# Patient Record
Sex: FEMALE | Race: WHITE | NOT HISPANIC OR LATINO | Employment: FULL TIME | URBAN - METROPOLITAN AREA
[De-identification: names, ages, dates, MRNs, and addresses within clinical notes are randomized per-mention and may not be internally consistent; named-entity substitution may affect disease eponyms.]

---

## 2017-01-31 ENCOUNTER — HOSPITAL ENCOUNTER (OUTPATIENT)
Dept: MAMMOGRAPHY | Facility: CLINIC | Age: 52
Discharge: HOME | End: 2017-01-31
Attending: INTERNAL MEDICINE | Admitting: INTERNAL MEDICINE
Payer: COMMERCIAL

## 2017-01-31 DIAGNOSIS — Z12.31 VISIT FOR SCREENING MAMMOGRAM: ICD-10-CM

## 2017-01-31 PROCEDURE — 77063 BREAST TOMOSYNTHESIS BI: CPT

## 2017-01-31 PROCEDURE — G0202 SCR MAMMO BI INCL CAD: HCPCS

## 2017-08-26 ENCOUNTER — HOSPITAL ENCOUNTER (EMERGENCY)
Facility: CLINIC | Age: 52
Discharge: HOME | End: 2017-08-26
Attending: EMERGENCY MEDICINE | Admitting: EMERGENCY MEDICINE
Payer: COMMERCIAL

## 2017-08-26 VITALS
OXYGEN SATURATION: 99 % | DIASTOLIC BLOOD PRESSURE: 81 MMHG | SYSTOLIC BLOOD PRESSURE: 120 MMHG | HEART RATE: 75 BPM | RESPIRATION RATE: 16 BRPM | TEMPERATURE: 97.3 F

## 2017-08-26 DIAGNOSIS — M54.5 ACUTE LOW BACK PAIN, UNSPECIFIED BACK PAIN LATERALITY, WITH SCIATICA PRESENCE UNSPECIFIED: ICD-10-CM

## 2017-08-26 PROCEDURE — 99282 EMERGENCY DEPT VISIT SF MDM: CPT

## 2017-08-26 RX ORDER — LIDOCAINE 50 MG/G
PATCH TOPICAL
Qty: 20 PATCH | Refills: 0 | Status: SHIPPED | OUTPATIENT
Start: 2017-08-26 | End: 2019-12-23

## 2017-08-26 RX ORDER — METHYLPREDNISOLONE 4 MG
TABLET, DOSE PACK ORAL
Qty: 21 TABLET | Refills: 0 | Status: SHIPPED | OUTPATIENT
Start: 2017-08-26 | End: 2019-12-23

## 2017-08-26 RX ORDER — CYCLOBENZAPRINE HCL 10 MG
5-10 TABLET ORAL 3 TIMES DAILY PRN
Qty: 20 TABLET | Refills: 0 | Status: SHIPPED | OUTPATIENT
Start: 2017-08-26 | End: 2019-12-23

## 2017-08-26 ASSESSMENT — ENCOUNTER SYMPTOMS
DIARRHEA: 0
DYSURIA: 0
DIFFICULTY URINATING: 0
BACK PAIN: 1
CONSTIPATION: 0

## 2017-08-26 NOTE — ED AVS SNAPSHOT
Emergency Department    [address] [facility] [address]    Phone:  [phone]    Fax:  [phone]                                       Rae Pearson   MRN: [id number]    Department:   Emergency Department   Date of Visit:  8/26/2017           After Visit Summary Signature Page     I have received my discharge instructions, and my questions have been answered. I have discussed any challenges I see with this plan with the nurse or doctor.    ..........................................................................................................................................  Patient/Patient Representative Signature      ..........................................................................................................................................  Patient Representative Print Name and Relationship to Patient    ..................................................               ................................................  Date                                            Time    ..........................................................................................................................................  Reviewed by Signature/Title    ...................................................              ..............................................  Date                                                            Time

## 2017-08-26 NOTE — DISCHARGE INSTRUCTIONS
Start the steroids today  Use flexeril for muscle spasms  Can also try the lidocaine patches  Continue to see physical therapy  Use the tylenol and ibuprofen for pain but be cautious on how much of the ibuprofen/sulindac you are using with the steroids  FOllow up with your primary care provider later this week  Discharge Instructions  Back Pain  You were seen today for back pain. Back pain can have many causes, but most will get better without surgery or other specific treatment. Sometimes there is a herniated ( slipped ) disc. We don t usually do MRI scans to look for these right away, since most herniated discs will get better on their own with time.  Today, we did not find any evidence that your back pain was caused by a serious condition, such as an infection, fracture, or tumor. However, sometimes symptoms develop over time and cannot be found during an emergency visit, so it is very important that you follow up with your primary doctor.  Return to the Emergency Department if:    You develop a fever with your back pain.     You have weakness or change in sensation in one or both legs.    You lose control of your bowels or bladder, or can t empty your bladder.    Your pain gets much worse.     Follow-up with your doctor:    Unless your pain has completely gone away, please make an appointment with your doctor within one week.  You may need further management of your back pain, such as more pain medication, imaging such as an X-ray or MRI, or physical therapy.    What can I do to help myself?    Remain Active -- People are often afraid that they will hurt their back further or delay recovery by remaining active, but this is one of the best things you can do for your back. In fact, prolonged bed rest is not recommended. Studies have shown that people with low back pain recover faster when they remain active. Movement helps to bring blood flow to the muscles and relieve muscle spasms as well as preventing loss of  muscle strength.    Heat -- Using a heating pad can help with low back pain during the first few weeks. Do not sleep with a heating pad, as you can be burned.     Pain medications - You may take a pain medication such as Tylenol  (acetaminophen), Advil , Nuprin  (ibuprofen) or Aleve  (naproxen).  If you have been given a narcotic such as Vicodin  (hydrocodone with acetaminophen), Percocet  (oxycodone with acetaminophen), codeine, or a muscle relaxant such as Flexeril  (cyclobenzaprine) or Soma  (carisoprodol), do not drive for four hours after you have taken it. If the narcotic contains Tylenol  (acetaminophen), do not take Tylenol  with it. All narcotics will cause constipation, so eat a high fiber diet.   If you were given a prescription for medicine here today, be sure to read all of the information (including the package insert) that comes with your prescription.  This will include important information about the medicine, its side effects, and any warnings that you need to know about.  The pharmacist who fills the prescription can provide more information and answer questions you may have about the medicine.  If you have questions or concerns that the pharmacist cannot address, please call or return to the Emergency Department.   Remember that you can always come back to the Emergency Department if you are not able to see your regular doctor in the amount of time listed above, if you get any new symptoms, or if there is anything that worries you.

## 2017-08-26 NOTE — ED PROVIDER NOTES
History     Chief Complaint:  Back Pain     HPI   Rae Pearson is a 51 year old female who presents with back pain. In October the patient started going to see a  where she began performing a number of non-weight bearing exercises to help lose weight and help her back as she was having back problems a year ago. For the last ten days the patient has had back pain which she states may have been caused by going to her  twice during that week. Since that time the pain has been getting better. However today the patient was reaching to  jozef litter, which caused her back pain to flair up. The increase of pain today is the reason that she presents to the emergency department today. Currently the patient rates her central lower back pain at a 5/10 severity, and states it is a shooting pain that radiates down into her legs. Similar pain one year ago that improved with steroids. Sitting makes the pain worse, while walking around seems to make it a bit better. To combat this pain the patient has been alternating with three Tylenol in the morning and three ibuprofen with dinner. She has also has been stretching and icing her back to help the pain. The patient denies any bowel or bladder issues or a history of diabetes. No numbness/tingling or weakness. There are no other complaints at this time.  No falls.    Allergies:  Amitriptyline Hcl  Contrast Dye  Tetracycline        Medications:    Atenolol  Hydrochlorothiazide  Omeprazole  Sulindac  Ranitidine HCL  Cipro    Past Medical History:    Diverticulosis  Gastro-esophageal reflux disease  Hypertension  Neck pain    Past Surgical History:    Eye surgery  GYN surgery    Family History:    History reviewed. No pertinent family history.      Social History:  Presents with a family member   Tobacco use: Current everyday smoker   Alcohol use: N/A  PCP: NEEMA GUTIERREZ    Marital Status:  Single     Review of Systems   Gastrointestinal: Negative  for constipation and diarrhea.   Genitourinary: Negative for difficulty urinating and dysuria.   Musculoskeletal: Positive for back pain.   All other systems reviewed and are negative.    Physical Exam     Patient Vitals for the past 24 hrs:   BP Temp Temp src Pulse Resp SpO2   08/26/17 1321 - - - 75 16 99 %   08/26/17 1310 120/81 97.3  F (36.3  C) Temporal 74 16 97 %           Physical Exam    General: The patient is walking around in her room  Head:  The scalp, face, and head appear normal  Eyes:  The pupils are equal and round  ENT:    Moist mucous membranes  Neck:  Normal range of motion  CV:  Regular rate and underlying rhythm     DP/PT pulses 2+ bilaterally    Normal S1 and S2  Resp:  Lungs are clear    There is no tachypnea    Non-labored breathing    No rales    No wheezing   GI:  Abdomen is soft, there is no rigidity    No distension    No guarding    No abdominal tenderness    Non-surgical without peritoneal features  MS:  Back:    The cervical and thoracic spine is non-tender in the midline    The lumbar spine is non-tender in the midline    There is lumbar paraspinous muscular pain to palpation bilaterally    Legs:    There is normal motor strength:     Iliopsoas, quadriceps, hamstrings, tibialis anterior, gastrocnemius, EHL    There is no sensory abnormality/paresthesia    There is no numbness on lower extremities    Patellar reflexes are normal    There is normal capillary refill to the toes  Skin:  No rash or acute skin lesions noted.  No shingles noted.  Neuro: Speech is normal and fluent    Face symmetric    Gait stable    Emergency Department Course     Emergency Department Course:    Past medical records, nursing notes, and vitals reviewed.  1317: I performed an exam of the patient as documented above. Clinical findings and plan explained to the Patient. Patient discharged home with instructions regarding supportive care, medications, and reasons to return as well as the importance of close  follow-up were reviewed.        Impression & Plan      Medical Decision Making:    The patients presents for evaluation of low back pain. There is no history of recent trauma, fever, new neurologic symptoms or other high risk features. With no midline tenderness and no history of recent trauma, I do not feel x-rays are indicated and no  evidence to support the need for emergent MRI. Findings at this time are most consistent with mechanical low back pain without evidence of acute cord compromise or cauda equina syndrome. The patient has a normal neurologic exam of the lower extremities. History and exam at this time do no support alternate cause of low back pain including but not limited to spinal epidural abscess or hematoma, discitis, spine fracture (no direct trauma to the back), or any serious acute intraabdominal process (including but not limited to renal lithiasis, pyelonephritis, AAA, or aortic dissection). The patient does not have a history of malignancy. The patient has no history of IV drug use. The patient was counseled regarding the need for supportive care and close follow up with primary care. Indications for return to seek urgent re-evaluation were reviewed including but not  limited to change to urinary or bowel patterns, saddle numbness, leg weakness, worsening or no improvement in pain. A prescription for Flexeril, Medrol Dosepak, and Lidoderm were provided. Early mobilization, continued follow up with  and PCP recommended. Reports improvement in past with steroids so will start these again and cautioned about side effects of this medication.    Diagnosis:    ICD-10-CM   1. Acute low back pain, unspecified back pain laterality, with sciatica presence unspecified M54.5       Disposition:  The patient was discharged to home with a plan as discussed.     Discharge Medications:  Discharge Medication List as of 8/26/2017  1:37 PM      START taking these medications    Details   cyclobenzaprine  (FLEXERIL) 10 MG tablet Take 0.5-1 tablets (5-10 mg) by mouth 3 times daily as needed for muscle spasms, Disp-20 tablet, R-0, Local Print      methylPREDNISolone (MEDROL DOSEPAK) 4 MG tablet Follow package instructions, Disp-21 tablet, R-0, Local Print      lidocaine (LIDODERM) 5 % Patch Apply up to 3 patches to painful area at once for up to 12 h within a 24 h period.  Remove after 12 hours.Disp-20 patch, R-0Local Print           Gume Leon  8/26/2017    EMERGENCY DEPARTMENT    I, Gume Leon, am serving as a scribe at 1:17 PM on 8/26/2017 to document services personally performed by Carmela Carty MD based on my observations and the provider's statements to me.       Carmela Carty MD  08/26/17 1937

## 2017-08-26 NOTE — ED AVS SNAPSHOT
Emergency Department    [address] [facility] [address]    Phone:  [phone]    Fax:  [phone]                                       Rae Pearson   MRN: [id number]    Department:   Emergency Department   Date of Visit:  8/26/2017           Patient Information     Date Of Birth          1965        Your diagnoses for this visit were:     Acute low back pain, unspecified back pain laterality, with sciatica presence unspecified        You were seen by Carmela Carty MD.      Follow-up Information     Schedule an appointment as soon as possible for a visit with Jill Magana.    Specialty:  Internal Medicine    Contact information:    [address] [address] [phone]          Follow up with  Emergency Department.    Specialty:  EMERGENCY MEDICINE    Why:  If symptoms worsen    Contact information:    [address] [facility] [id number] [phone]        Discharge Instructions       Start the steroids today  Use flexeril for muscle spasms  Can also try the lidocaine patches  Continue to see physical therapy  Use the tylenol and ibuprofen for pain but be cautious on how much of the ibuprofen/sulindac you are using with the steroids  FOllow up with your primary care provider later this week  Discharge Instructions  Back Pain  You were seen today for back pain. Back pain can have many causes, but most will get better without surgery or other specific treatment. Sometimes there is a herniated ( slipped ) disc. We don t usually do MRI scans to look for these right away, since most herniated discs will get better on their own with time.  Today, we did not find any evidence that your back pain was caused by a serious condition, such as an infection, fracture, or tumor. However, sometimes symptoms develop over time and cannot be found during an emergency visit, so it is very important that you follow up  with your primary doctor.  Return to the Emergency Department if:    You develop a fever with your back pain.     You have weakness or change in sensation in one or both legs.    You lose control of your bowels or bladder, or can t empty your bladder.    Your pain gets much worse.     Follow-up with your doctor:    Unless your pain has completely gone away, please make an appointment with your doctor within one week.  You may need further management of your back pain, such as more pain medication, imaging such as an X-ray or MRI, or physical therapy.    What can I do to help myself?    Remain Active -- People are often afraid that they will hurt their back further or delay recovery by remaining active, but this is one of the best things you can do for your back. In fact, prolonged bed rest is not recommended. Studies have shown that people with low back pain recover faster when they remain active. Movement helps to bring blood flow to the muscles and relieve muscle spasms as well as preventing loss of muscle strength.    Heat -- Using a heating pad can help with low back pain during the first few weeks. Do not sleep with a heating pad, as you can be burned.     Pain medications - You may take a pain medication such as Tylenol  (acetaminophen), Advil , Nuprin  (ibuprofen) or Aleve  (naproxen).  If you have been given a narcotic such as Vicodin  (hydrocodone with acetaminophen), Percocet  (oxycodone with acetaminophen), codeine, or a muscle relaxant such as Flexeril  (cyclobenzaprine) or Soma  (carisoprodol), do not drive for four hours after you have taken it. If the narcotic contains Tylenol  (acetaminophen), do not take Tylenol  with it. All narcotics will cause constipation, so eat a high fiber diet.   If you were given a prescription for medicine here today, be sure to read all of the information (including the package insert) that comes with your prescription.  This will include important information about the  medicine, its side effects, and any warnings that you need to know about.  The pharmacist who fills the prescription can provide more information and answer questions you may have about the medicine.  If you have questions or concerns that the pharmacist cannot address, please call or return to the Emergency Department.   Remember that you can always come back to the Emergency Department if you are not able to see your regular doctor in the amount of time listed above, if you get any new symptoms, or if there is anything that worries you.        24 Hour Appointment Hotline       To make an appointment at any [facility], call [phone] ([phone]). If you don't have a family doctor or clinic, we will help you find one. [facility] are conveniently located to serve the needs of you and your family.             Review of your medicines      START taking        Dose / Directions Last dose taken    cyclobenzaprine 10 MG tablet   Commonly known as:  FLEXERIL   Dose:  5-10 mg   Quantity:  20 tablet        Take 0.5-1 tablets (5-10 mg) by mouth 3 times daily as needed for muscle spasms   Refills:  0        lidocaine 5 % Patch   Commonly known as:  LIDODERM   Quantity:  20 patch        Apply up to 3 patches to painful area at once for up to 12 h within a 24 h period.  Remove after 12 hours.   Refills:  0        methylPREDNISolone 4 MG tablet   Commonly known as:  MEDROL DOSEPAK   Quantity:  21 tablet        Follow package instructions   Refills:  0          Our records show that you are taking the medicines listed below. If these are incorrect, please call your family doctor or clinic.        Dose / Directions Last dose taken    ATENOLOL PO        Take  by mouth.   Refills:  0        ciprofloxacin 500 MG tablet   Commonly known as:  CIPRO   Dose:  500 mg   Quantity:  20 tablet        Take 1 tablet by mouth 2 times daily.   Refills:  0        HYDROCHLOROTHIAZIDE PO        Take  by mouth.   Refills:  0         OMEPRAZOLE PO        Take  by mouth.   Refills:  0        oxyCODONE-acetaminophen 5-325 MG per tablet   Commonly known as:  PERCOCET   Dose:  1-2 tablet   Quantity:  15 tablet        Take 1-2 tablets by mouth every 4 hours as needed for pain.   Refills:  0        RANITIDINE HCL PO        Take  by mouth.   Refills:  0        SULINDAC PO        Take  by mouth.   Refills:  0                Prescriptions were sent or printed at these locations (3 Prescriptions)                   Other Prescriptions                Printed at Department/Unit printer (3 of 3)         cyclobenzaprine (FLEXERIL) 10 MG tablet               methylPREDNISolone (MEDROL DOSEPAK) 4 MG tablet               lidocaine (LIDODERM) 5 % Patch                Orders Needing Specimen Collection     None      Pending Results     No orders found from 8/24/2017 to 8/27/2017.            Pending Culture Results     No orders found from 8/24/2017 to 8/27/2017.            Pending Results Instructions     If you had any lab results that were not finalized at the time of your Discharge, you can call the ED Lab Result RN at [phone]. You will be contacted by this team for any positive Lab results or changes in treatment. The nurses are available 7 days a week from 10A to 6:30P.  You can leave a message 24 hours per day and they will return your call.        Test Results From Your Hospital Stay               Clinical Quality Measure: Blood Pressure Screening     Your blood pressure was checked while you were in the emergency department today. The last reading we obtained was  BP: 120/81 . Please read the guidelines below about what these numbers mean and what you should do about them.  If your systolic blood pressure (the top number) is less than 120 and your diastolic blood pressure (the bottom number) is less than 80, then your blood pressure is normal. There is nothing more that you need to do about it.  If your systolic blood pressure (the top number) is  "120-139 or your diastolic blood pressure (the bottom number) is 80-89, your blood pressure may be higher than it should be. You should have your blood pressure rechecked within a year by a primary care provider.  If your systolic blood pressure (the top number) is 140 or greater or your diastolic blood pressure (the bottom number) is 90 or greater, you may have high blood pressure. High blood pressure is treatable, but if left untreated over time it can put you at risk for heart attack, stroke, or kidney failure. You should have your blood pressure rechecked by a primary care provider within the next 4 weeks.  If your provider in the emergency department today gave you specific instructions to follow-up with your doctor or provider even sooner than that, you should follow that instruction and not wait for up to 4 weeks for your follow-up visit.        Thank you for choosing [facility]       Thank you for choosing [facility] for your care. Our goal is always to provide you with excellent care. Hearing back from our patients is one way we can continue to improve our services. Please take a few minutes to complete the written survey that you may receive in the mail after you visit with us. Thank you!        [facility] Information     [employer] lets you send messages to your doctor, view your test results, renew your prescriptions, schedule appointments and more. To sign up, go to [email or web address] . Click on \"Log in\" on the left side of the screen, which will take you to the Welcome page. Then click on \"Sign up Now\" on the right side of the page.     You will be asked to enter the access code listed below, as well as some personal information. Please follow the directions to create your username and password.     Your access code is: [email or web address]  Expires: 2017  1:37 PM     Your access code will  in 90 days. If you need help or a new code, please call your [facility] clinic or [phone].        Care " EveryWhere ID     This is your Care EveryWhere ID. This could be used by other organizations to access your [facility] medical records  [id number]        Equal Access to Services     PATEL HARRISON : Elsa Peng, patricia wellington, gennaro szymanski, neo taylor. So [facility] [phone].    ATENCIÓN: Si habla español, tiene a stephenson disposición servicios gratuitos de asistencia lingüística. Llame al [phone].    We comply with applicable federal civil rights laws and [place] laws. We do not discriminate on the basis of race, color, national origin, age, disability sex, sexual orientation or gender identity.            After Visit Summary       This is your record. Keep this with you and show to your community pharmacist(s) and doctor(s) at your next visit.

## 2018-02-09 ENCOUNTER — HOSPITAL ENCOUNTER (OUTPATIENT)
Dept: MAMMOGRAPHY | Facility: CLINIC | Age: 53
Discharge: HOME | End: 2018-02-09
Attending: INTERNAL MEDICINE | Admitting: INTERNAL MEDICINE
Payer: COMMERCIAL

## 2018-02-09 DIAGNOSIS — Z12.31 VISIT FOR SCREENING MAMMOGRAM: ICD-10-CM

## 2018-02-09 PROCEDURE — 77067 SCR MAMMO BI INCL CAD: CPT

## 2019-03-08 ENCOUNTER — HOSPITAL ENCOUNTER (OUTPATIENT)
Dept: MAMMOGRAPHY | Facility: CLINIC | Age: 54
Discharge: HOME | End: 2019-03-08
Attending: INTERNAL MEDICINE | Admitting: INTERNAL MEDICINE
Payer: COMMERCIAL

## 2019-03-08 DIAGNOSIS — Z12.31 VISIT FOR SCREENING MAMMOGRAM: ICD-10-CM

## 2019-03-08 PROCEDURE — 77063 BREAST TOMOSYNTHESIS BI: CPT

## 2019-12-23 ENCOUNTER — HOSPITAL ENCOUNTER (EMERGENCY)
Facility: CLINIC | Age: 54
Discharge: HOME | End: 2019-12-23
Attending: EMERGENCY MEDICINE | Admitting: EMERGENCY MEDICINE
Payer: COMMERCIAL

## 2019-12-23 ENCOUNTER — APPOINTMENT (OUTPATIENT)
Dept: CT IMAGING | Facility: CLINIC | Age: 54
End: 2019-12-23
Attending: EMERGENCY MEDICINE
Payer: COMMERCIAL

## 2019-12-23 VITALS
HEART RATE: 61 BPM | HEIGHT: 65 IN | SYSTOLIC BLOOD PRESSURE: 116 MMHG | BODY MASS INDEX: 30.49 KG/M2 | OXYGEN SATURATION: 97 % | WEIGHT: 183 LBS | RESPIRATION RATE: 16 BRPM | TEMPERATURE: 97.8 F | DIASTOLIC BLOOD PRESSURE: 77 MMHG

## 2019-12-23 DIAGNOSIS — K57.32 DIVERTICULITIS OF COLON: ICD-10-CM

## 2019-12-23 LAB
ALBUMIN SERPL-MCNC: 4.1 G/DL (ref 3.4–5)
ALBUMIN UR-MCNC: NEGATIVE MG/DL
ALP SERPL-CCNC: 73 U/L (ref 40–150)
ALT SERPL W P-5'-P-CCNC: 30 U/L (ref 0–50)
ANION GAP SERPL CALCULATED.3IONS-SCNC: 4 MMOL/L (ref 3–14)
APPEARANCE UR: CLEAR
AST SERPL W P-5'-P-CCNC: 20 U/L (ref 0–45)
BASOPHILS # BLD AUTO: 0 10E9/L (ref 0–0.2)
BASOPHILS NFR BLD AUTO: 0.3 %
BILIRUB SERPL-MCNC: 1.1 MG/DL (ref 0.2–1.3)
BILIRUB UR QL STRIP: NEGATIVE
BUN SERPL-MCNC: 14 MG/DL (ref 7–30)
CALCIUM SERPL-MCNC: 9.3 MG/DL (ref 8.5–10.1)
CHLORIDE SERPL-SCNC: 103 MMOL/L (ref 94–109)
CO2 SERPL-SCNC: 28 MMOL/L (ref 20–32)
COLOR UR AUTO: YELLOW
CREAT SERPL-MCNC: 0.58 MG/DL (ref 0.52–1.04)
DIFFERENTIAL METHOD BLD: ABNORMAL
EOSINOPHIL # BLD AUTO: 0.2 10E9/L (ref 0–0.7)
EOSINOPHIL NFR BLD AUTO: 1.7 %
ERYTHROCYTE [DISTWIDTH] IN BLOOD BY AUTOMATED COUNT: 13.1 % (ref 10–15)
GFR SERPL CREATININE-BSD FRML MDRD: >90 ML/MIN/{1.73_M2}
GLUCOSE SERPL-MCNC: 104 MG/DL (ref 70–99)
GLUCOSE UR STRIP-MCNC: NEGATIVE MG/DL
HCT VFR BLD AUTO: 45.3 % (ref 35–47)
HGB BLD-MCNC: 15.4 G/DL (ref 11.7–15.7)
HGB UR QL STRIP: NEGATIVE
IMM GRANULOCYTES # BLD: 0 10E9/L (ref 0–0.4)
IMM GRANULOCYTES NFR BLD: 0.2 %
KETONES UR STRIP-MCNC: NEGATIVE MG/DL
LEUKOCYTE ESTERASE UR QL STRIP: NEGATIVE
LIPASE SERPL-CCNC: 76 U/L (ref 73–393)
LYMPHOCYTES # BLD AUTO: 2.6 10E9/L (ref 0.8–5.3)
LYMPHOCYTES NFR BLD AUTO: 22.9 %
MCH RBC QN AUTO: 31.1 PG (ref 26.5–33)
MCHC RBC AUTO-ENTMCNC: 34 G/DL (ref 31.5–36.5)
MCV RBC AUTO: 92 FL (ref 78–100)
MONOCYTES # BLD AUTO: 0.8 10E9/L (ref 0–1.3)
MONOCYTES NFR BLD AUTO: 6.9 %
MUCOUS THREADS #/AREA URNS LPF: PRESENT /LPF
NEUTROPHILS # BLD AUTO: 7.7 10E9/L (ref 1.6–8.3)
NEUTROPHILS NFR BLD AUTO: 68 %
NITRATE UR QL: NEGATIVE
PH UR STRIP: 5.5 PH (ref 5–7)
PLATELET # BLD AUTO: 264 10E9/L (ref 150–450)
POTASSIUM SERPL-SCNC: 3.7 MMOL/L (ref 3.4–5.3)
PROT SERPL-MCNC: 7.9 G/DL (ref 6.8–8.8)
RBC # BLD AUTO: 4.95 10E12/L (ref 3.8–5.2)
RBC #/AREA URNS AUTO: 1 /HPF (ref 0–2)
SODIUM SERPL-SCNC: 135 MMOL/L (ref 133–144)
SOURCE: ABNORMAL
SP GR UR STRIP: 1.02 (ref 1–1.03)
SQUAMOUS #/AREA URNS AUTO: <1 /HPF (ref 0–1)
UROBILINOGEN UR STRIP-MCNC: NORMAL MG/DL (ref 0–2)
WBC # BLD AUTO: 11.2 10E9/L (ref 4–11)
WBC #/AREA URNS AUTO: <1 /HPF (ref 0–5)

## 2019-12-23 PROCEDURE — 96375 TX/PRO/DX INJ NEW DRUG ADDON: CPT

## 2019-12-23 PROCEDURE — 25000132 ZZH RX MED GY IP 250 OP 250 PS 637: Performed by: EMERGENCY MEDICINE

## 2019-12-23 PROCEDURE — 25000125 ZZHC RX 250: Performed by: EMERGENCY MEDICINE

## 2019-12-23 PROCEDURE — 85025 COMPLETE CBC W/AUTO DIFF WBC: CPT | Performed by: EMERGENCY MEDICINE

## 2019-12-23 PROCEDURE — 99285 EMERGENCY DEPT VISIT HI MDM: CPT | Mod: 25

## 2019-12-23 PROCEDURE — 74176 CT ABD & PELVIS W/O CONTRAST: CPT

## 2019-12-23 PROCEDURE — 25000128 H RX IP 250 OP 636: Performed by: EMERGENCY MEDICINE

## 2019-12-23 PROCEDURE — 80053 COMPREHEN METABOLIC PANEL: CPT | Performed by: EMERGENCY MEDICINE

## 2019-12-23 PROCEDURE — 96374 THER/PROPH/DIAG INJ IV PUSH: CPT

## 2019-12-23 PROCEDURE — 83690 ASSAY OF LIPASE: CPT | Performed by: EMERGENCY MEDICINE

## 2019-12-23 PROCEDURE — 81001 URINALYSIS AUTO W/SCOPE: CPT | Performed by: EMERGENCY MEDICINE

## 2019-12-23 RX ORDER — KETOROLAC TROMETHAMINE 15 MG/ML
15 INJECTION, SOLUTION INTRAMUSCULAR; INTRAVENOUS ONCE
Status: COMPLETED | OUTPATIENT
Start: 2019-12-23 | End: 2019-12-23

## 2019-12-23 RX ORDER — OXYCODONE HYDROCHLORIDE 5 MG/1
5 TABLET ORAL EVERY 6 HOURS PRN
Qty: 12 TABLET | Refills: 0 | Status: SHIPPED | OUTPATIENT
Start: 2019-12-23

## 2019-12-23 RX ORDER — HYDROMORPHONE HYDROCHLORIDE 1 MG/ML
0.5 INJECTION, SOLUTION INTRAMUSCULAR; INTRAVENOUS; SUBCUTANEOUS ONCE
Status: COMPLETED | OUTPATIENT
Start: 2019-12-23 | End: 2019-12-23

## 2019-12-23 RX ADMIN — KETOROLAC TROMETHAMINE 15 MG: 15 INJECTION, SOLUTION INTRAMUSCULAR; INTRAVENOUS at 12:15

## 2019-12-23 RX ADMIN — LIDOCAINE HYDROCHLORIDE 30 ML: 20 SOLUTION ORAL; TOPICAL at 13:40

## 2019-12-23 RX ADMIN — HYDROMORPHONE HYDROCHLORIDE 0.5 MG: 1 INJECTION, SOLUTION INTRAMUSCULAR; INTRAVENOUS; SUBCUTANEOUS at 13:40

## 2019-12-23 ASSESSMENT — ENCOUNTER SYMPTOMS
NAUSEA: 0
DIARRHEA: 0
VOMITING: 0
BLOOD IN STOOL: 0
ABDOMINAL PAIN: 1

## 2019-12-23 ASSESSMENT — MIFFLIN-ST. JEOR: SCORE: 1420.96

## 2019-12-23 NOTE — ED PROVIDER NOTES
"  History     Chief Complaint:  Abdominal Pain      HPI   Rae Pearson is a 54 year old female with a history of gastroesophageal reflux disease, obesity, diverticulosis, and hypertension who presents with abdominal pain. Patient states that last night, just following dinner, she began have left upper quadrant abdominal pain. She describes the pain as \"stabbing\" and persistent throughout the night. She has worsening pain with sitting and had trouble sleeping throughout the night. She denies a history of pancreatitis, but does note that she drinks quite a bit of alcohol. She denies nausea, emesis, diarrhea, and hematochezia.     Allergies:  Amitriptyline Hcl  Contrast Dye  Tetracycline      Medications:    Aspirin 81mg   Omeprazole   Sulindac  Amlodipine  Atenolol   Hydrochlorothiazide     Past Medical History:    Diverticulosis of large intestine   Hyperlipidemia   Obesity  Esotropia left eye  Meibomian gland dysfunction   Astigmatism   Hypertension  Cervicalgia   Gastroesophageal reflux disease     Past Surgical History:    Eye surgery  GYN surgery   Knee surgery     Family History:    Father - alcohol/drug abuse, colon cancer, melanoma, cerebrovascular disease, congestive heart failure, hyperlipidemia, hypertension   Mother - alcohol/drug abuse, breast cancer, colon cancer, hyperlipidemia, hypertension   Sister - cancer, hypertension     Social History:  The patient was accompanied to the ED by her .  Smoking Status: former smoker  Smokeless Tobacco: never  Alcohol Use: yes   Marital Status:        Review of Systems   Gastrointestinal: Positive for abdominal pain. Negative for blood in stool, diarrhea, nausea and vomiting.   All other systems reviewed and are negative.      Physical Exam     Patient Vitals for the past 24 hrs:   BP Temp Temp src Pulse Heart Rate Resp SpO2 Height Weight   12/23/19 1047 116/77 97.8  F (36.6  C) Oral 61 61 16 97 % 1.651 m (5' 5\") 83 kg (183 lb)       Physical " Exam  General: Resting on the gurney, appears uncomfortable  Head:  The scalp, face, and head appear normal  Mouth/Throat: Mucus membranes are moist  CV:  Regular rate    Normal S1 and S2  No pathological murmur   Resp:  Breath sounds clear and equal bilaterally    Non-labored, no retractions or accessory muscle use    No coarseness    No wheezing   GI:  Abdomen is soft, no rigidity    Left upper quadrant tenderness to palpation.     Negative Eden's sign. No tenderness at McBurney's point.   MS:  Normal motor assessment of all extremities.    Good capillary refill noted.  Skin:  No rash or lesions noted.  Neuro:   Speech is normal and fluent. No apparent deficit.  Psych: Awake. Alert.  Normal affect.      Appropriate interactions.    Emergency Department Course     Imaging:  Radiology findings were communicated with the patient and family who voiced understanding of the findings.    Abd/pelvis CT no contrast - Stone Protocol  Final Result  IMPRESSION: Acute uncomplicated diverticulitis involving the  descending colon.  JAMES RAMIREZ MD    Laboratory:  Laboratory findings were communicated with the patient and family who voiced understanding of the findings.    CBC: WBC 11.2 (H) o/w WNL. (HGB 15.4, )   CMP: Glucose 104 (H) o/w WNL (Creatinine 0.58)     Lipase: 76   UA: Yellow, clear urine. Mucous urine present o/w WNL      Interventions:  1215 Toradol 15mg IV   1340 Dilaudid 0.5mg IV     GI Cocktail 30mg PO    Emergency Department Course:  Past medical records, nursing notes, and vitals reviewed.    1141 I performed an exam of the patient as documented above.   .   IV was inserted and blood was drawn for laboratory testing, results above.  The patient provided a urine sample here in the emergency department. This was sent for laboratory testing, findings above.    1345 I rechecked the patient and discussed the results of her workup thus far.     Findings and plan explained to the Patient and spouse.  Patient discharged home with instructions regarding supportive care, medications, and reasons to return. The importance of close follow-up was reviewed. The patient was prescribed Augmentin and Oxycodone.     I personally reviewed the laboratory and imaging results with the Patient and spouse and answered all related questions prior to discharge.     Impression & Plan     Medical Decision Making:  Rae Pearson is a 54 year old female presented with abdominal pain and CT confirms diverticulitis without abscess or perforation; this appears consistent with the history and physical exam so I doubt another underlying etiology is also present.  The patient's pain has been controlled by interventions in the emergency department.  This represents uncomplicated diverticulitis at this time.  There are no signs of sepsis, shock or bacteremia.  The patient is tolerating po and pain is controlled and I believe safe for outpatient treatment.  I educated the patient regarding the symptoms and signs that should prompt return to the Emergency Department.  This would include fevers, chills, vomiting, and more intense pain.  The patient is to take antibiotics and pain medications as directed.    Follow-up with primary care physician is indicated in 1-2 days.     Diagnosis:    ICD-10-CM    1. Diverticulitis of colon K57.32        Disposition:  Discharged to home.    Discharge Medications:  New Prescriptions    AMOXICILLIN-CLAVULANATE (AUGMENTIN) 875-125 MG TABLET    Take 1 tablet by mouth 2 times daily for 7 days    OXYCODONE (ROXICODONE) 5 MG TABLET    Take 1 tablet (5 mg) by mouth every 6 hours as needed for pain       Scribe Disclosure:  Yeny RIVERA, am serving as a scribe at 11:36 AM on 12/23/2019 to document services personally performed by Alanna Mcclure MD based on my observations and the provider's statements to me.   12/23/2019    EMERGENCY DEPARTMENT       Alanna Mcclure MD  12/25/19 2775

## 2019-12-23 NOTE — ED TRIAGE NOTES
LUQ abdominal pain for less than 24 hrs, no nausea, no vomiting. Sudden onset, worse with some movements and sore to touch

## 2019-12-23 NOTE — ED NOTES
Bed: ED03  Expected date: 12/23/19  Expected time: 10:36 AM  Means of arrival: Ambulance  Comments:  437 103f influenza ETA 1048

## 2019-12-23 NOTE — ED AVS SNAPSHOT
Emergency Department  [address] [facility] [address]  Phone:  [phone]  Fax:  [phone]                                    Rae Pearson   MRN: [id number]    Department:   Emergency Department   Date of Visit:  12/23/2019           After Visit Summary Signature Page    I have received my discharge instructions, and my questions have been answered. I have discussed any challenges I see with this plan with the nurse or doctor.    ..........................................................................................................................................  Patient/Patient Representative Signature      ..........................................................................................................................................  Patient Representative Print Name and Relationship to Patient    ..................................................               ................................................  Date                                   Time    ..........................................................................................................................................  Reviewed by Signature/Title    ...................................................              ..............................................  Date                                               Time          22EPIC Rev 08/18

## 2020-10-07 ENCOUNTER — HOSPITAL ENCOUNTER (OUTPATIENT)
Dept: MAMMOGRAPHY | Facility: CLINIC | Age: 55
Discharge: HOME | End: 2020-10-07
Attending: INTERNAL MEDICINE | Admitting: INTERNAL MEDICINE
Payer: COMMERCIAL

## 2020-10-07 DIAGNOSIS — Z12.31 VISIT FOR SCREENING MAMMOGRAM: ICD-10-CM

## 2020-10-07 PROCEDURE — 77067 SCR MAMMO BI INCL CAD: CPT

## 2021-10-11 ENCOUNTER — HOSPITAL ENCOUNTER (OUTPATIENT)
Dept: MAMMOGRAPHY | Facility: CLINIC | Age: 56
Discharge: HOME | End: 2021-10-11
Attending: INTERNAL MEDICINE | Admitting: INTERNAL MEDICINE
Payer: COMMERCIAL

## 2021-10-11 DIAGNOSIS — Z12.31 VISIT FOR SCREENING MAMMOGRAM: ICD-10-CM

## 2021-10-11 PROCEDURE — 77063 BREAST TOMOSYNTHESIS BI: CPT

## 2022-10-13 ENCOUNTER — HOSPITAL ENCOUNTER (OUTPATIENT)
Dept: MAMMOGRAPHY | Facility: CLINIC | Age: 57
Discharge: HOME | End: 2022-10-13
Attending: INTERNAL MEDICINE | Admitting: INTERNAL MEDICINE
Payer: COMMERCIAL

## 2022-10-13 DIAGNOSIS — Z12.31 VISIT FOR SCREENING MAMMOGRAM: ICD-10-CM

## 2022-10-13 PROCEDURE — 77067 SCR MAMMO BI INCL CAD: CPT

## 2022-11-27 ENCOUNTER — HEALTH MAINTENANCE LETTER (OUTPATIENT)
Age: 57
End: 2022-11-27

## 2023-10-20 ENCOUNTER — HOSPITAL ENCOUNTER (OUTPATIENT)
Dept: MAMMOGRAPHY | Facility: CLINIC | Age: 58
Discharge: HOME | End: 2023-10-20
Attending: INTERNAL MEDICINE | Admitting: INTERNAL MEDICINE
Payer: COMMERCIAL

## 2023-10-20 DIAGNOSIS — Z12.31 VISIT FOR SCREENING MAMMOGRAM: ICD-10-CM

## 2023-10-20 PROCEDURE — 77067 SCR MAMMO BI INCL CAD: CPT

## 2024-02-09 ENCOUNTER — LAB REQUISITION (OUTPATIENT)
Dept: LAB | Facility: CLINIC | Age: 59
End: 2024-02-09

## 2024-02-09 DIAGNOSIS — Z01.419 ENCOUNTER FOR GYNECOLOGICAL EXAMINATION (GENERAL) (ROUTINE) WITHOUT ABNORMAL FINDINGS: ICD-10-CM

## 2024-02-09 PROCEDURE — G0145 SCR C/V CYTO,THINLAYER,RESCR: HCPCS | Performed by: OBSTETRICS & GYNECOLOGY

## 2024-02-09 PROCEDURE — 87624 HPV HI-RISK TYP POOLED RSLT: CPT | Performed by: OBSTETRICS & GYNECOLOGY

## 2024-02-13 LAB
BKR LAB AP GYN ADEQUACY: NORMAL
BKR LAB AP GYN INTERPRETATION: NORMAL
BKR LAB AP HPV REFLEX: NORMAL
BKR LAB AP LMP: NORMAL
BKR LAB AP PREVIOUS ABNL DX: NORMAL
BKR LAB AP PREVIOUS ABNORMAL: NORMAL
PATH REPORT.COMMENTS IMP SPEC: NORMAL
PATH REPORT.COMMENTS IMP SPEC: NORMAL
PATH REPORT.RELEVANT HX SPEC: NORMAL

## 2024-02-16 LAB
HUMAN PAPILLOMA VIRUS 16 DNA: NEGATIVE
HUMAN PAPILLOMA VIRUS 18 DNA: NEGATIVE
HUMAN PAPILLOMA VIRUS FINAL DIAGNOSIS: NORMAL
HUMAN PAPILLOMA VIRUS OTHER HR: NEGATIVE

## 2024-02-17 ENCOUNTER — MEDICAL CORRESPONDENCE (OUTPATIENT)
Dept: SCHEDULING | Facility: CLINIC | Age: 59
End: 2024-02-17
Payer: COMMERCIAL

## 2024-03-17 ENCOUNTER — HEALTH MAINTENANCE LETTER (OUTPATIENT)
Age: 59
End: 2024-03-17

## 2024-11-22 ENCOUNTER — HOSPITAL ENCOUNTER (OUTPATIENT)
Dept: MAMMOGRAPHY | Facility: CLINIC | Age: 59
Discharge: HOME | End: 2024-11-22
Attending: INTERNAL MEDICINE | Admitting: INTERNAL MEDICINE
Payer: COMMERCIAL

## 2024-11-22 DIAGNOSIS — Z12.31 VISIT FOR SCREENING MAMMOGRAM: ICD-10-CM

## 2024-11-22 PROCEDURE — 77063 BREAST TOMOSYNTHESIS BI: CPT

## 2024-11-22 PROCEDURE — 77067 SCR MAMMO BI INCL CAD: CPT

## 2025-03-22 ENCOUNTER — HEALTH MAINTENANCE LETTER (OUTPATIENT)
Age: 60
End: 2025-03-22